# Patient Record
Sex: FEMALE | Race: WHITE | Employment: UNEMPLOYED | URBAN - METROPOLITAN AREA
[De-identification: names, ages, dates, MRNs, and addresses within clinical notes are randomized per-mention and may not be internally consistent; named-entity substitution may affect disease eponyms.]

---

## 2023-10-15 ENCOUNTER — HOSPITAL ENCOUNTER (EMERGENCY)
Age: 28
Discharge: HOME | End: 2023-10-16
Attending: EMERGENCY MEDICINE
Payer: COMMERCIAL

## 2023-10-15 DIAGNOSIS — J20.9 ACUTE BRONCHITIS, UNSPECIFIED ORGANISM: Primary | ICD-10-CM

## 2023-10-15 PROCEDURE — 99284 EMERGENCY DEPT VISIT MOD MDM: CPT

## 2023-10-15 RX ORDER — BUPROPION HYDROCHLORIDE 300 MG/1
300 TABLET ORAL EVERY MORNING
COMMUNITY

## 2023-10-15 RX ORDER — ACETAZOLAMIDE 500 MG/1
500 CAPSULE, EXTENDED RELEASE ORAL 2 TIMES DAILY
COMMUNITY

## 2023-10-15 RX ORDER — LEVOTHYROXINE SODIUM 0.07 MG/1
75 TABLET ORAL DAILY
COMMUNITY

## 2023-10-15 RX ORDER — INSULIN ASPART 100 [IU]/ML
INJECTION, SOLUTION INTRAVENOUS; SUBCUTANEOUS
COMMUNITY

## 2023-10-15 ASSESSMENT — PATIENT HEALTH QUESTIONNAIRE - PHQ9
SUM OF ALL RESPONSES TO PHQ QUESTIONS 1-9: 0
SUM OF ALL RESPONSES TO PHQ QUESTIONS 1-9: 0
SUM OF ALL RESPONSES TO PHQ9 QUESTIONS 1 & 2: 0
SUM OF ALL RESPONSES TO PHQ QUESTIONS 1-9: 0
2. FEELING DOWN, DEPRESSED OR HOPELESS: 0
1. LITTLE INTEREST OR PLEASURE IN DOING THINGS: 0
SUM OF ALL RESPONSES TO PHQ QUESTIONS 1-9: 0

## 2023-10-15 ASSESSMENT — LIFESTYLE VARIABLES
HOW MANY STANDARD DRINKS CONTAINING ALCOHOL DO YOU HAVE ON A TYPICAL DAY: PATIENT DOES NOT DRINK
HOW OFTEN DO YOU HAVE A DRINK CONTAINING ALCOHOL: NEVER

## 2023-10-15 ASSESSMENT — PAIN DESCRIPTION - DESCRIPTORS: DESCRIPTORS: TIGHTNESS

## 2023-10-15 ASSESSMENT — PAIN SCALES - GENERAL: PAINLEVEL_OUTOF10: 3

## 2023-10-15 ASSESSMENT — PAIN DESCRIPTION - LOCATION: LOCATION: THROAT

## 2023-10-15 ASSESSMENT — PAIN - FUNCTIONAL ASSESSMENT: PAIN_FUNCTIONAL_ASSESSMENT: 0-10

## 2023-10-16 ENCOUNTER — APPOINTMENT (OUTPATIENT)
Dept: GENERAL RADIOLOGY | Age: 28
End: 2023-10-16
Payer: COMMERCIAL

## 2023-10-16 VITALS
HEART RATE: 95 BPM | OXYGEN SATURATION: 99 % | SYSTOLIC BLOOD PRESSURE: 105 MMHG | BODY MASS INDEX: 35.19 KG/M2 | TEMPERATURE: 98.7 F | RESPIRATION RATE: 16 BRPM | HEIGHT: 62 IN | WEIGHT: 191.2 LBS | DIASTOLIC BLOOD PRESSURE: 66 MMHG

## 2023-10-16 LAB
FLUAV RNA RESP QL NAA+PROBE: NOT DETECTED
FLUBV RNA RESP QL NAA+PROBE: NOT DETECTED
SARS-COV-2 RNA RESP QL NAA+PROBE: NOT DETECTED

## 2023-10-16 PROCEDURE — 6370000000 HC RX 637 (ALT 250 FOR IP): Performed by: EMERGENCY MEDICINE

## 2023-10-16 PROCEDURE — 71046 X-RAY EXAM CHEST 2 VIEWS: CPT

## 2023-10-16 PROCEDURE — 87636 SARSCOV2 & INF A&B AMP PRB: CPT

## 2023-10-16 RX ORDER — IPRATROPIUM BROMIDE AND ALBUTEROL SULFATE 2.5; .5 MG/3ML; MG/3ML
1 SOLUTION RESPIRATORY (INHALATION) ONCE
Status: COMPLETED | OUTPATIENT
Start: 2023-10-16 | End: 2023-10-16

## 2023-10-16 RX ORDER — ALBUTEROL SULFATE 90 UG/1
2 AEROSOL, METERED RESPIRATORY (INHALATION) EVERY 4 HOURS PRN
Qty: 1 EACH | Refills: 0 | Status: SHIPPED | OUTPATIENT
Start: 2023-10-16

## 2023-10-16 RX ORDER — BENZONATATE 100 MG/1
100 CAPSULE ORAL 3 TIMES DAILY PRN
Qty: 20 CAPSULE | Refills: 0 | Status: SHIPPED | OUTPATIENT
Start: 2023-10-16 | End: 2023-10-23

## 2023-10-16 RX ORDER — PREDNISONE 20 MG/1
40 TABLET ORAL DAILY
Qty: 10 TABLET | Refills: 0 | Status: SHIPPED | OUTPATIENT
Start: 2023-10-16 | End: 2023-10-21

## 2023-10-16 RX ORDER — IPRATROPIUM BROMIDE AND ALBUTEROL SULFATE 2.5; .5 MG/3ML; MG/3ML
SOLUTION RESPIRATORY (INHALATION)
Status: DISCONTINUED
Start: 2023-10-16 | End: 2023-10-16 | Stop reason: HOSPADM

## 2023-10-16 RX ORDER — IPRATROPIUM BROMIDE AND ALBUTEROL SULFATE 2.5; .5 MG/3ML; MG/3ML
1 SOLUTION RESPIRATORY (INHALATION)
Status: DISCONTINUED | OUTPATIENT
Start: 2023-10-16 | End: 2023-10-16

## 2023-10-16 RX ADMIN — IPRATROPIUM BROMIDE AND ALBUTEROL SULFATE 1 DOSE: .5; 2.5 SOLUTION RESPIRATORY (INHALATION) at 00:43

## 2023-10-16 ASSESSMENT — ENCOUNTER SYMPTOMS
ALLERGIC/IMMUNOLOGIC NEGATIVE: 1
EYES NEGATIVE: 1
GASTROINTESTINAL NEGATIVE: 1
VOICE CHANGE: 0
TROUBLE SWALLOWING: 0
SORE THROAT: 0
RHINORRHEA: 1
COUGH: 1

## 2023-10-16 ASSESSMENT — PAIN - FUNCTIONAL ASSESSMENT: PAIN_FUNCTIONAL_ASSESSMENT: NONE - DENIES PAIN

## 2023-10-16 NOTE — ED PROVIDER NOTES
[address] [address] ED  EMERGENCY DEPARTMENT ENCOUNTER      Pt Name: Piper Merino  MRN: [id number]  [address] [facility] 1995  Date of evaluation: 10/15/2023  Provider: Nicolasa Dow MD    CHIEF COMPLAINT       Chief Complaint   Patient presents with    Cough     Been going on for couple weeks. HISTORY OF PRESENT ILLNESS   (Location/Symptom, Timing/Onset, Context/Setting, Quality, Duration, Modifying Factors, Severity)  Note limiting factors. Piper Merino is a 34 y.o. female who presents to the emergency department with URI symptoms about 2 to 3 weeks ago that then worsened over the past 2 to 3 days with cough which is occasionally productive of mucus and sometimes feels like \"bubbling\" in her chest no chest pain. No black or bloody sputum. She did have 1 episode of posttussive emesis but no other vomiting. No personal or family history of asthma. She denies abdominal pain. Ejective fevers present. Congestion worse. No sore throat        Nursing Notes were reviewed. REVIEW OF SYSTEMS    (2-9 systems for level 4, 10 or more for level 5)     Review of Systems   Constitutional:  Positive for fever. HENT:  Positive for congestion and rhinorrhea. Negative for sore throat, trouble swallowing and voice change. Eyes: Negative. Respiratory:  Positive for cough. Cardiovascular: Negative. Gastrointestinal: Negative. Endocrine: Negative. Genitourinary: Negative. Musculoskeletal: Negative. Skin: Negative. Allergic/Immunologic: Negative. Neurological: Negative. Hematological: Negative. Psychiatric/Behavioral: Negative. Except as noted above the remainder of the review of systems was reviewed and negative.        PAST MEDICAL HISTORY     Past Medical History:   Diagnosis Date    Anxiety     Diabetes ([address])     Thyroid disease          SURGICAL HISTORY       Past Surgical History:   Procedure Laterality Date     SECTION      DENTAL SURGERY